# Patient Record
Sex: FEMALE | URBAN - METROPOLITAN AREA
[De-identification: names, ages, dates, MRNs, and addresses within clinical notes are randomized per-mention and may not be internally consistent; named-entity substitution may affect disease eponyms.]

---

## 2020-07-16 ENCOUNTER — NEW PATIENT (OUTPATIENT)
Dept: URBAN - METROPOLITAN AREA CLINIC 44 | Facility: CLINIC | Age: 60
End: 2020-07-16
Payer: MEDICAID

## 2020-07-16 ENCOUNTER — SURGERY (OUTPATIENT)
Dept: URBAN - METROPOLITAN AREA SURGERY 19 | Facility: SURGERY | Age: 60
End: 2020-07-16
Payer: MEDICAID

## 2020-07-16 DIAGNOSIS — H40.213 ACUTE ANGLE-CLOSURE GLAUCOMA, BILATERAL: Primary | ICD-10-CM

## 2020-07-16 PROCEDURE — 92002 INTRM OPH EXAM NEW PATIENT: CPT | Performed by: OPHTHALMOLOGY

## 2020-07-16 PROCEDURE — 92020 GONIOSCOPY: CPT | Performed by: OPHTHALMOLOGY

## 2020-07-16 RX ORDER — BRINZOLAMIDE/BRIMONIDINE TARTRATE 10; 2 MG/ML; MG/ML
SUSPENSION/ DROPS OPHTHALMIC
Qty: 3 | Refills: 1 | Status: INACTIVE
Start: 2020-07-16 | End: 2020-09-03

## 2020-07-16 RX ORDER — PREDNISOLONE ACETATE 10 MG/ML
1 % SUSPENSION/ DROPS OPHTHALMIC
Qty: 1 | Refills: 1 | Status: INACTIVE
Start: 2020-07-16 | End: 2020-09-03

## 2020-07-16 RX ORDER — BIMATOPROST 0.1 MG/ML
0.01 % SOLUTION/ DROPS OPHTHALMIC
Qty: 1 | Refills: 3 | Status: INACTIVE
Start: 2020-07-16 | End: 2020-07-31

## 2020-07-16 ASSESSMENT — INTRAOCULAR PRESSURE
OD: 18
OD: 18
OS: 16
OS: 30
OD: 12
OS: 16
OS: 30
OD: 12

## 2020-07-17 ENCOUNTER — NEW PATIENT (OUTPATIENT)
Dept: URBAN - METROPOLITAN AREA CLINIC 24 | Facility: CLINIC | Age: 60
End: 2020-07-17

## 2020-07-17 ENCOUNTER — SURGERY (OUTPATIENT)
Dept: URBAN - METROPOLITAN AREA SURGERY 12 | Facility: SURGERY | Age: 60
End: 2020-07-17
Payer: MEDICAID

## 2020-07-17 PROCEDURE — 99024 POSTOP FOLLOW-UP VISIT: CPT | Performed by: OPHTHALMOLOGY

## 2020-07-17 ASSESSMENT — INTRAOCULAR PRESSURE
OD: 11
OD: 11
OS: 12
OD: 13
OS: 12
OD: 13

## 2020-07-28 ENCOUNTER — FOLLOW UP ESTABLISHED (OUTPATIENT)
Dept: URBAN - METROPOLITAN AREA CLINIC 10 | Facility: CLINIC | Age: 60
End: 2020-07-28
Payer: MEDICAID

## 2020-07-28 DIAGNOSIS — H25.13 AGE-RELATED NUCLEAR CATARACT, BILATERAL: ICD-10-CM

## 2020-07-28 PROCEDURE — 92012 INTRM OPH EXAM EST PATIENT: CPT | Performed by: OPHTHALMOLOGY

## 2020-07-28 RX ORDER — LATANOPROST 50 UG/ML
0.005 % SOLUTION OPHTHALMIC
Qty: 1 | Refills: 1 | Status: INACTIVE
Start: 2020-07-28 | End: 2020-08-27

## 2020-07-28 ASSESSMENT — INTRAOCULAR PRESSURE
OS: 13
OD: 12

## 2020-09-03 ENCOUNTER — NEW PATIENT (OUTPATIENT)
Dept: URBAN - METROPOLITAN AREA CLINIC 10 | Facility: CLINIC | Age: 60
End: 2020-09-03
Payer: MEDICAID

## 2020-09-03 DIAGNOSIS — H25.813 COMBINED FORMS OF AGE-RELATED CATARACT, BILATERAL: Primary | ICD-10-CM

## 2020-09-03 PROCEDURE — 92014 COMPRE OPH EXAM EST PT 1/>: CPT | Performed by: OPTOMETRIST

## 2020-09-03 PROCEDURE — 92134 CPTRZ OPH DX IMG PST SGM RTA: CPT | Performed by: OPTOMETRIST

## 2020-09-03 PROCEDURE — 92004 COMPRE OPH EXAM NEW PT 1/>: CPT | Performed by: OPTOMETRIST

## 2020-09-03 PROCEDURE — 92250 FUNDUS PHOTOGRAPHY W/I&R: CPT | Performed by: OPTOMETRIST

## 2020-09-03 ASSESSMENT — VISUAL ACUITY
OD: 20/40
OS: 20/30

## 2020-09-03 ASSESSMENT — INTRAOCULAR PRESSURE
OD: 12
OS: 11

## 2021-04-13 ENCOUNTER — OFFICE VISIT (OUTPATIENT)
Dept: URBAN - METROPOLITAN AREA CLINIC 43 | Facility: CLINIC | Age: 61
End: 2021-04-13
Payer: MEDICAID

## 2021-04-13 DIAGNOSIS — H43.812 VITREOUS DEGENERATION, LEFT EYE: Primary | ICD-10-CM

## 2021-04-13 PROCEDURE — 92014 COMPRE OPH EXAM EST PT 1/>: CPT | Performed by: OPTOMETRIST

## 2021-04-13 PROCEDURE — 92134 CPTRZ OPH DX IMG PST SGM RTA: CPT | Performed by: OPTOMETRIST

## 2021-04-13 RX ORDER — POLYVINYL ALCOHOL 14 MG/ML
1.4 % SOLUTION/ DROPS OPHTHALMIC
Qty: 5 | Refills: 11 | Status: ACTIVE
Start: 2021-04-13

## 2021-04-13 ASSESSMENT — KERATOMETRY
OS: 44.13
OD: 44.38

## 2021-04-13 ASSESSMENT — INTRAOCULAR PRESSURE
OD: 11
OS: 11

## 2021-04-13 NOTE — IMPRESSION/PLAN
Impression: Acute angle-closure glaucoma, bilateral: H40.213. Plan: IOP well controlled on latanoprost qhs OU LPIs patent OU
return for 2-3 weeks for HVF/IOP/pachs/OCT RNFL

## 2021-04-13 NOTE — IMPRESSION/PLAN
Impression: Vitreous degeneration, left eye: H43.812. Plan: Warning signs of retinal tear and detachment discussed with patient. To return to clinic STAT if any change in symptoms consistent with RT or RD.  No VMt on mac OCT

## 2021-07-28 ENCOUNTER — OFFICE VISIT (OUTPATIENT)
Dept: URBAN - METROPOLITAN AREA CLINIC 10 | Facility: CLINIC | Age: 61
End: 2021-07-28
Payer: MEDICAID

## 2021-07-28 DIAGNOSIS — H04.123 DRY EYE SYNDROME OF BILATERAL LACRIMAL GLANDS: ICD-10-CM

## 2021-07-28 PROCEDURE — 76514 ECHO EXAM OF EYE THICKNESS: CPT | Performed by: OPTOMETRIST

## 2021-07-28 PROCEDURE — 92012 INTRM OPH EXAM EST PATIENT: CPT | Performed by: OPTOMETRIST

## 2021-07-28 PROCEDURE — 92134 CPTRZ OPH DX IMG PST SGM RTA: CPT | Performed by: OPTOMETRIST

## 2021-07-28 PROCEDURE — 92083 EXTENDED VISUAL FIELD XM: CPT | Performed by: OPTOMETRIST

## 2021-07-28 ASSESSMENT — INTRAOCULAR PRESSURE
OS: 12
OD: 11

## 2021-07-28 NOTE — IMPRESSION/PLAN
Impression: Acute angle-closure glaucoma, bilateral: H40.213. Plan: IOP well controlled on latanoprost qhs OU LPIs patent OU. Pachs thin OU. HVF scattered defects, no glaucomatous pattern OU. Continue Latanoprost QHS OU. RTC 9 mo CEE/OCT RNFL.

## 2021-07-28 NOTE — IMPRESSION/PLAN
Impression: Dry eye syndrome of bilateral lacrimal glands: H04.123. Plan: Recommend artificial tears at least 4 times a day and gel drop or tear ointment at bedtime. Add lid hygiene, Omega 3s. Discussed through [facility] .

## 2022-05-09 ENCOUNTER — OFFICE VISIT (OUTPATIENT)
Dept: URBAN - METROPOLITAN AREA CLINIC 10 | Facility: CLINIC | Age: 62
End: 2022-05-09
Payer: MEDICAID

## 2022-05-09 DIAGNOSIS — H43.812 VITREOUS DEGENERATION, LEFT EYE: ICD-10-CM

## 2022-05-09 DIAGNOSIS — H25.813 COMBINED FORMS OF AGE-RELATED CATARACT, BILATERAL: Primary | ICD-10-CM

## 2022-05-09 DIAGNOSIS — H40.213 ACUTE ANGLE-CLOSURE GLAUCOMA, BILATERAL: ICD-10-CM

## 2022-05-09 DIAGNOSIS — H04.123 DRY EYE SYNDROME OF BILATERAL LACRIMAL GLANDS: ICD-10-CM

## 2022-05-09 DIAGNOSIS — G44.89 OTHER HEADACHE SYNDROME: ICD-10-CM

## 2022-05-09 PROCEDURE — 99214 OFFICE O/P EST MOD 30 MIN: CPT | Performed by: OPTOMETRIST

## 2022-05-09 PROCEDURE — 92133 CPTRZD OPH DX IMG PST SGM ON: CPT | Performed by: OPTOMETRIST

## 2022-05-09 ASSESSMENT — VISUAL ACUITY
OS: 20/50
OD: 20/60

## 2022-05-09 ASSESSMENT — INTRAOCULAR PRESSURE
OS: 13
OD: 13

## 2022-05-09 NOTE — IMPRESSION/PLAN
Impression: Combined forms of age-related cataract, bilateral: H25.813. Plan:  Discussed cataracts with patient. Discussed treatment options. Surgical treatment is recommended. Surgical risks and benefits discussed. Patient elects surgical treatment. Recommend surgery OU, OD first. Patient is candidate/interested in standard lens, Aim OD: -0.25. Aim OS: -0.25. Consider MIGS. Glaucoma surgeon recommended.  Outcome of surgery limitations include:  Dry eye syndrome of bilateral lacrimal glands, Acute angle-closure glaucoma, bilateral, Vitreous degeneration, left eye,

## 2022-05-09 NOTE — IMPRESSION/PLAN
Impression: Acute angle-closure glaucoma, bilateral: H40.213. Plan: IOP well controlled on latanoprost qhs OU LPIs patent OU. Pachs thin OU. OCT RNFL normal OD, thin inferior OS. Last HVF scattered defects, no glaucomatous pattern OU. Continue Latanoprost QHS OU. Consider MIGS and glaucoma surgeon for cat sx.

## 2022-05-09 NOTE — IMPRESSION/PLAN
Impression: Other headache syndrome: G44.89. Plan: Ocular health unremarkable for headache. Possible sinus issues. See PCP for further evaluation.

## 2022-07-01 ENCOUNTER — TESTING ONLY (OUTPATIENT)
Dept: URBAN - METROPOLITAN AREA CLINIC 10 | Facility: CLINIC | Age: 62
End: 2022-07-01
Payer: MEDICAID

## 2022-07-01 DIAGNOSIS — H25.813 COMBINED FORMS OF AGE-RELATED CATARACT, BILATERAL: Primary | ICD-10-CM

## 2022-07-05 ENCOUNTER — OFFICE VISIT (OUTPATIENT)
Dept: URBAN - METROPOLITAN AREA CLINIC 10 | Facility: CLINIC | Age: 62
End: 2022-07-05
Payer: MEDICAID

## 2022-07-05 DIAGNOSIS — G44.89 OTHER HEADACHE SYNDROME: ICD-10-CM

## 2022-07-05 DIAGNOSIS — H43.813 VITREOUS DEGENERATION, BILATERAL: ICD-10-CM

## 2022-07-05 DIAGNOSIS — H40.213 ACUTE ANGLE-CLOSURE GLAUCOMA, BILATERAL: ICD-10-CM

## 2022-07-05 DIAGNOSIS — H40.033 ANATOMICAL NARROW ANGLE, BILATERAL: ICD-10-CM

## 2022-07-05 DIAGNOSIS — H04.123 DRY EYE SYNDROME OF BILATERAL LACRIMAL GLANDS: ICD-10-CM

## 2022-07-05 DIAGNOSIS — H52.203 BILATERAL ASTIGMATISM: ICD-10-CM

## 2022-07-05 PROCEDURE — 92020 GONIOSCOPY: CPT | Performed by: OPHTHALMOLOGY

## 2022-07-05 PROCEDURE — 92014 COMPRE OPH EXAM EST PT 1/>: CPT | Performed by: OPHTHALMOLOGY

## 2022-07-05 PROCEDURE — 92083 EXTENDED VISUAL FIELD XM: CPT | Performed by: OPHTHALMOLOGY

## 2022-07-05 ASSESSMENT — INTRAOCULAR PRESSURE
OD: 16
OS: 16

## 2022-07-05 NOTE — IMPRESSION/PLAN
Impression: Other headache syndrome: G44.89. Plan: Followed by PCP. Discussed with patient, understands this may limit vision after surgery.

## 2022-07-05 NOTE — IMPRESSION/PLAN
Impression: Bilateral astigmatism: H52.203. Plan: Declines AMP. Discussed with patient that she will need glasses after surgery. Discussed with patient, understands this may limit vision after surgery. Also discussed unmasking of astigmatism.

## 2022-07-05 NOTE — IMPRESSION/PLAN
Impression: Combined forms of age-related cataract, bilateral: H25.813. Plan: (( AIM -0.25 OU: INJECTABLE OU (DEXYCU 1st choice),  NO ORA OU, NO LRI OU - declines AMP; MIGS OU  (KDB 1st, Istent as backup) do not tape head, Glaucoma coverage, NOTE LENS TYPE: [id number]; Citizen of [place] SPEAKING;   s/p LPI OU - PRIOR ANGLE CLOSURE -- LARGE LENS    )) Discussed cataract diagnosis with the patient. Appropriate testing ordered for cataract diagnosis prior to Preop. Risks and benefits of surgical treatment were discussed and understood. Patient desires surgical treatment. Discussed lens options with pt and pt is ok with wearing glasses after surgery. Patient desires surgery to proceed with surgery OU, OD  First. Both eyes examined, medically necessary due to impact in activities of daily living. Discussed with pt limitations of MIGS device and it does not replace  Glaucoma eye drops and would have to continue treatment and would still need glasses after surgery. Discussed higher risks with smaller pupil and discussed iris stretch and higher risks of bleeding.  present at time of discussion. Discussed higher risks due to prior angle closure.

## 2022-07-05 NOTE — IMPRESSION/PLAN
Impression: Acute angle-closure glaucoma, bilateral: H40.213. Plan: PLAN: On XAL QHS OU , test reviewed, IOP is similar ou but may proceed with cataract surgery with MIGS  (KDB 1st, Istent at backup) and Discussed glaucoma may limit vision after surgery, may proceed with migs   in hopes of better iop control - understands does not eliminate meds. Discussed possible unmasking of scotoma after surgery. TESTS: Reviewed 7/5/22  Visual Field - OD: Poor-superior, inferior scattered defects,; OS: Poor-superior, inferior scattered defects, Discussed Glaucoma diagnosis in detail with patient. Emphasized and explain complaince. poor compliance can lead to Blindness.

## 2022-07-06 ENCOUNTER — ADULT PHYSICAL (OUTPATIENT)
Dept: URBAN - METROPOLITAN AREA CLINIC 10 | Facility: CLINIC | Age: 62
End: 2022-07-06
Payer: MEDICAID

## 2022-07-06 DIAGNOSIS — Z01.818 ENCOUNTER FOR OTHER PREPROCEDURAL EXAMINATION: Primary | ICD-10-CM

## 2022-07-06 DIAGNOSIS — H25.813 COMBINED FORMS OF AGE-RELATED CATARACT, BILATERAL: ICD-10-CM

## 2022-07-06 PROCEDURE — 99203 OFFICE O/P NEW LOW 30 MIN: CPT | Performed by: PHYSICIAN ASSISTANT

## 2022-07-12 ENCOUNTER — SURGERY (OUTPATIENT)
Dept: URBAN - METROPOLITAN AREA SURGERY 5 | Facility: SURGERY | Age: 62
End: 2022-07-12
Payer: MEDICAID

## 2022-07-12 DIAGNOSIS — H25.813 COMBINED FORMS OF AGE-RELATED CATARACT, BILATERAL: Primary | ICD-10-CM

## 2022-07-12 DIAGNOSIS — H40.213 ACUTE ANGLE-CLOSURE GLAUCOMA, BILATERAL: ICD-10-CM

## 2022-07-13 ENCOUNTER — POST-OPERATIVE VISIT (OUTPATIENT)
Dept: URBAN - METROPOLITAN AREA CLINIC 10 | Facility: CLINIC | Age: 62
End: 2022-07-13
Payer: MEDICAID

## 2022-07-13 DIAGNOSIS — Z48.810 ENCOUNTER FOR SURGICAL AFTERCARE FOLLOWING SURGERY ON A SENSE ORGAN: Primary | ICD-10-CM

## 2022-07-13 PROCEDURE — 99024 POSTOP FOLLOW-UP VISIT: CPT | Performed by: STUDENT IN AN ORGANIZED HEALTH CARE EDUCATION/TRAINING PROGRAM

## 2022-07-13 ASSESSMENT — INTRAOCULAR PRESSURE: OD: 18

## 2022-07-13 NOTE — IMPRESSION/PLAN
Impression: S/P Cataract Extraction/IOL/KDB OD - 1 Day. Encounter for surgical aftercare following surgery on a sense organ  Z48.810. Post operative instructions reviewed - Plan: Reviewed post-op instructions. RTC if any pain or sudden changes to vision.

## 2022-07-20 ENCOUNTER — POST-OPERATIVE VISIT (OUTPATIENT)
Dept: URBAN - METROPOLITAN AREA CLINIC 10 | Facility: CLINIC | Age: 62
End: 2022-07-20
Payer: MEDICAID

## 2022-07-20 DIAGNOSIS — Z48.810 ENCOUNTER FOR SURGICAL AFTERCARE FOLLOWING SURGERY ON A SENSE ORGAN: Primary | ICD-10-CM

## 2022-07-20 DIAGNOSIS — H25.12 AGE-RELATED NUCLEAR CATARACT, LEFT EYE: ICD-10-CM

## 2022-07-20 PROCEDURE — 99024 POSTOP FOLLOW-UP VISIT: CPT | Performed by: OPTOMETRIST

## 2022-07-20 RX ORDER — PREDNISOLONE ACETATE 10 MG/ML
1 % SUSPENSION/ DROPS OPHTHALMIC
Qty: 30 | Refills: 0 | Status: INACTIVE
Start: 2022-07-20 | End: 2022-07-21

## 2022-07-20 ASSESSMENT — VISUAL ACUITY
OS: 20/60
OD: 20/40

## 2022-07-20 NOTE — IMPRESSION/PLAN
Impression:  Encounter for surgical aftercare following surgery on a sense organ  Z48.810.  Post operative instructions reviewed - Plan: Start Prednisolone acetate QID OD
RTC in 1 wk for post-op

## 2022-07-28 ENCOUNTER — POST-OPERATIVE VISIT (OUTPATIENT)
Dept: URBAN - METROPOLITAN AREA CLINIC 10 | Facility: CLINIC | Age: 62
End: 2022-07-28
Payer: MEDICAID

## 2022-07-28 DIAGNOSIS — Z48.810 ENCOUNTER FOR SURGICAL AFTERCARE FOLLOWING SURGERY ON A SENSE ORGAN: Primary | ICD-10-CM

## 2022-07-28 PROCEDURE — 99024 POSTOP FOLLOW-UP VISIT: CPT | Performed by: OPTOMETRIST

## 2022-07-28 ASSESSMENT — VISUAL ACUITY: OD: 20/25

## 2022-07-28 ASSESSMENT — INTRAOCULAR PRESSURE: OD: 12

## 2022-07-28 NOTE — IMPRESSION/PLAN
Impression: S/P Cataract Extraction/IOL/KDB OD - 16 Days. Encounter for surgical aftercare following surgery on a sense organ  Z48.810. Condition is improving - Plan: Cleared for surgery second eye OS Confirmed Distance aim OD Pt happy c vision. Pt. will cont. pred OD tid x 1 wk then taper weekly.

## 2022-09-20 ENCOUNTER — OFFICE VISIT (OUTPATIENT)
Dept: URBAN - METROPOLITAN AREA CLINIC 10 | Facility: CLINIC | Age: 62
End: 2022-09-20
Payer: MEDICAID

## 2022-09-20 DIAGNOSIS — H43.813 VITREOUS DEGENERATION, BILATERAL: ICD-10-CM

## 2022-09-20 DIAGNOSIS — H25.812 COMBINED FORMS OF AGE-RELATED CATARACT, LEFT EYE: Primary | ICD-10-CM

## 2022-09-20 DIAGNOSIS — H04.123 TEAR FILM INSUFFICIENCY OF BILATERAL LACRIMAL GLANDS: ICD-10-CM

## 2022-09-20 DIAGNOSIS — H40.213 ACUTE ANGLE-CLOSURE GLAUCOMA, BILATERAL: ICD-10-CM

## 2022-09-20 PROCEDURE — 99214 OFFICE O/P EST MOD 30 MIN: CPT | Performed by: OPTOMETRIST

## 2022-09-20 PROCEDURE — 92134 CPTRZ OPH DX IMG PST SGM RTA: CPT | Performed by: OPTOMETRIST

## 2022-09-20 ASSESSMENT — INTRAOCULAR PRESSURE
OS: 12
OD: 13

## 2022-09-20 ASSESSMENT — VISUAL ACUITY
OS: 20/30
OD: 20/30

## 2022-09-20 NOTE — IMPRESSION/PLAN
Impression: Combined forms of age-related cataract, left eye: H25.812. Plan:  Discussed cataracts with patient. Discussed treatment options. Surgical treatment is recommended. Surgical risks and benefits discussed. Patient elects surgical treatment. Recommend surgery OS. Patient is candidate/interested in standard lens. Aim OS: undecided. Patient will need glasses for full time wear. Patient will need glasses for all near work, including computer. Patient advised of implications of lost myopia. Patient will need glasses for distance work (if near aim).

## 2022-09-20 NOTE — IMPRESSION/PLAN
Impression: Acute angle-closure glaucoma, bilateral: H40.213. Plan: On XAL QHS OU  (KDB 1st, Istent at backup) and Discussed glaucoma may limit vision after surgery. 7/5/22  Visual Field - OD: Poor-superior, inferior scattered defects,; OS: Poor-superior, inferior scattered defects, Discussed Glaucoma diagnosis in detail with patient. Emphasized and explain complaince. poor compliance can lead to Blindness. Cont XAL QHS OU.

## 2022-09-27 ENCOUNTER — ADULT PHYSICAL (OUTPATIENT)
Dept: URBAN - METROPOLITAN AREA CLINIC 10 | Facility: CLINIC | Age: 62
End: 2022-09-27
Payer: MEDICAID

## 2022-09-27 DIAGNOSIS — H25.12 AGE-RELATED NUCLEAR CATARACT, LEFT EYE: ICD-10-CM

## 2022-09-27 DIAGNOSIS — Z01.818 ENCOUNTER FOR OTHER PREPROCEDURAL EXAMINATION: Primary | ICD-10-CM

## 2022-09-27 PROCEDURE — 99213 OFFICE O/P EST LOW 20 MIN: CPT | Performed by: PHYSICIAN ASSISTANT

## 2022-10-04 ENCOUNTER — SURGERY (OUTPATIENT)
Dept: URBAN - METROPOLITAN AREA SURGERY 5 | Facility: SURGERY | Age: 62
End: 2022-10-04
Payer: MEDICAID

## 2022-10-04 DIAGNOSIS — H25.12 AGE-RELATED NUCLEAR CATARACT, LEFT EYE: Primary | ICD-10-CM

## 2022-10-04 DIAGNOSIS — H40.213 ACUTE ANGLE-CLOSURE GLAUCOMA, BILATERAL: ICD-10-CM

## 2022-10-05 ENCOUNTER — POST-OPERATIVE VISIT (OUTPATIENT)
Dept: URBAN - METROPOLITAN AREA CLINIC 10 | Facility: CLINIC | Age: 62
End: 2022-10-05

## 2022-10-05 DIAGNOSIS — Z96.1 PRESENCE OF INTRAOCULAR LENS: Primary | ICD-10-CM

## 2022-10-05 PROCEDURE — 99024 POSTOP FOLLOW-UP VISIT: CPT | Performed by: OPTOMETRIST

## 2022-10-05 ASSESSMENT — INTRAOCULAR PRESSURE: OS: 10

## 2022-10-05 NOTE — IMPRESSION/PLAN
Impression: S/P Cataract Extraction/IOL/MARSHA OS - 1 Day. Presence of intraocular lens  Z96.1. Post operative instructions reviewed - Plan: Dexycu, no gtts. Cont Latanoprost qhs OU
 RTC as scheduled c MRx and IOP check c Mac OCT.

## 2022-11-07 ENCOUNTER — POST-OPERATIVE VISIT (OUTPATIENT)
Dept: URBAN - METROPOLITAN AREA CLINIC 10 | Facility: CLINIC | Age: 62
End: 2022-11-07
Payer: MEDICAID

## 2022-11-07 DIAGNOSIS — H52.4 PRESBYOPIA: Primary | ICD-10-CM

## 2022-11-07 DIAGNOSIS — Z96.1 PRESENCE OF INTRAOCULAR LENS: ICD-10-CM

## 2022-11-07 PROCEDURE — 99024 POSTOP FOLLOW-UP VISIT: CPT | Performed by: OPTOMETRIST

## 2022-11-07 ASSESSMENT — INTRAOCULAR PRESSURE
OD: 11
OS: 10

## 2022-11-07 ASSESSMENT — VISUAL ACUITY
OD: 20/30
OS: 20/20

## 2022-11-07 NOTE — IMPRESSION/PLAN
Impression:  Presence of intraocular lens  Z96.1. Plan: Cold compresses and artificial tears as needed for comfort. May use Pataday QD OU.

## 2023-01-25 ENCOUNTER — OFFICE VISIT (OUTPATIENT)
Dept: URBAN - METROPOLITAN AREA CLINIC 10 | Facility: CLINIC | Age: 63
End: 2023-01-25
Payer: MEDICAID

## 2023-01-25 DIAGNOSIS — H04.123 DRY EYE SYNDROME OF BILATERAL LACRIMAL GLANDS: ICD-10-CM

## 2023-01-25 DIAGNOSIS — H43.813 VITREOUS DEGENERATION, BILATERAL: ICD-10-CM

## 2023-01-25 DIAGNOSIS — H40.213 ACUTE ANGLE-CLOSURE GLAUCOMA, BILATERAL: Primary | ICD-10-CM

## 2023-01-25 DIAGNOSIS — H10.45 OTHER CHRONIC ALLERGIC CONJUNCTIVITIS: ICD-10-CM

## 2023-01-25 PROCEDURE — 92014 COMPRE OPH EXAM EST PT 1/>: CPT | Performed by: OPTOMETRIST

## 2023-01-25 PROCEDURE — 92133 CPTRZD OPH DX IMG PST SGM ON: CPT | Performed by: OPTOMETRIST

## 2023-01-25 ASSESSMENT — INTRAOCULAR PRESSURE
OD: 11
OS: 11

## 2023-01-25 ASSESSMENT — VISUAL ACUITY
OD: 20/25
OS: 20/25

## 2023-01-25 NOTE — IMPRESSION/PLAN
Impression: Acute angle-closure glaucoma, bilateral: H40.213. Plan: IOP well controlled on latanoprost qhs OU LPIs patent OU. Pt is also s/p cat sx. Pachs thin OU. OCT RNFL normal OD, thin inferior OS. STABLE. Last HVF scattered defects, no glaucomatous pattern OU. Continue Latanoprost QHS OU. RTC 1 year CEE c possible HVF 24-2 and OCT RNFL.

## 2023-01-25 NOTE — IMPRESSION/PLAN
Impression: Dry eye syndrome of bilateral lacrimal glands: H04.123. Plan: Recommend artificial tears at least 4 times a day and gel drop or tear ointment at bedtime. Add lid hygiene, Omega 3s. Discussed through .

## 2023-11-08 ENCOUNTER — OFFICE VISIT (OUTPATIENT)
Dept: URBAN - METROPOLITAN AREA CLINIC 10 | Facility: CLINIC | Age: 63
End: 2023-11-08
Payer: MEDICAID

## 2023-11-08 DIAGNOSIS — H04.123 DRY EYE SYNDROME OF BILATERAL LACRIMAL GLANDS: Primary | ICD-10-CM

## 2023-11-08 DIAGNOSIS — H26.493 OTHER SECONDARY CATARACT, BILATERAL: ICD-10-CM

## 2023-11-08 DIAGNOSIS — H40.213 ACUTE ANGLE-CLOSURE GLAUCOMA, BILATERAL: ICD-10-CM

## 2023-11-08 DIAGNOSIS — H10.45 OTHER CHRONIC ALLERGIC CONJUNCTIVITIS: ICD-10-CM

## 2023-11-08 DIAGNOSIS — H43.312 VITREOUS MEMBRANES AND STRANDS, LEFT EYE: ICD-10-CM

## 2023-11-08 PROCEDURE — 92133 CPTRZD OPH DX IMG PST SGM ON: CPT | Performed by: OPTOMETRIST

## 2023-11-08 PROCEDURE — 92134 CPTRZ OPH DX IMG PST SGM RTA: CPT | Performed by: OPTOMETRIST

## 2023-11-08 PROCEDURE — 99214 OFFICE O/P EST MOD 30 MIN: CPT | Performed by: OPTOMETRIST

## 2023-11-08 RX ORDER — LIFITEGRAST 50 MG/ML
5 % SOLUTION/ DROPS OPHTHALMIC
Qty: 60 | Refills: 11 | Status: ACTIVE
Start: 2023-11-08

## 2023-11-08 ASSESSMENT — VISUAL ACUITY
OS: 20/25
OD: 20/20

## 2023-11-08 ASSESSMENT — INTRAOCULAR PRESSURE
OS: 11
OD: 11

## 2024-02-29 ENCOUNTER — OFFICE VISIT (OUTPATIENT)
Dept: URBAN - METROPOLITAN AREA CLINIC 10 | Facility: CLINIC | Age: 64
End: 2024-02-29
Payer: MEDICARE

## 2024-02-29 DIAGNOSIS — H40.213 ACUTE ANGLE-CLOSURE GLAUCOMA, BILATERAL: ICD-10-CM

## 2024-02-29 DIAGNOSIS — H26.493 OTHER SECONDARY CATARACT, BILATERAL: ICD-10-CM

## 2024-02-29 DIAGNOSIS — H04.123 DRY EYE SYNDROME OF BILATERAL LACRIMAL GLANDS: Primary | ICD-10-CM

## 2024-02-29 DIAGNOSIS — H52.4 PRESBYOPIA: ICD-10-CM

## 2024-02-29 DIAGNOSIS — H10.45 OTHER CHRONIC ALLERGIC CONJUNCTIVITIS: ICD-10-CM

## 2024-02-29 PROCEDURE — 99213 OFFICE O/P EST LOW 20 MIN: CPT | Performed by: OPTOMETRIST

## 2024-02-29 RX ORDER — LATANOPROST 50 UG/ML
0.005 % SOLUTION OPHTHALMIC
Qty: 7.5 | Refills: 11 | Status: ACTIVE
Start: 2024-02-29

## 2024-02-29 RX ORDER — CYCLOSPORINE 0.5 MG/ML
0.05 % EMULSION OPHTHALMIC
Qty: 180 | Refills: 3 | Status: ACTIVE
Start: 2024-02-29

## 2024-02-29 ASSESSMENT — INTRAOCULAR PRESSURE
OD: 11
OS: 12

## 2024-02-29 ASSESSMENT — VISUAL ACUITY
OD: 20/20
OS: 20/25

## 2025-02-12 ENCOUNTER — OFFICE VISIT (OUTPATIENT)
Dept: URBAN - METROPOLITAN AREA CLINIC 10 | Facility: CLINIC | Age: 65
End: 2025-02-12
Payer: MEDICARE

## 2025-02-12 DIAGNOSIS — H40.213 ACUTE ANGLE-CLOSURE GLAUCOMA, BILATERAL: ICD-10-CM

## 2025-02-12 DIAGNOSIS — H43.312 VITREOUS MEMBRANES AND STRANDS, LEFT EYE: ICD-10-CM

## 2025-02-12 DIAGNOSIS — H26.493 OTHER SECONDARY CATARACT, BILATERAL: ICD-10-CM

## 2025-02-12 DIAGNOSIS — H04.123 DRY EYE SYNDROME OF BILATERAL LACRIMAL GLANDS: Primary | ICD-10-CM

## 2025-02-12 PROCEDURE — 92133 CPTRZD OPH DX IMG PST SGM ON: CPT | Performed by: OPTOMETRIST

## 2025-02-12 PROCEDURE — 92083 EXTENDED VISUAL FIELD XM: CPT | Performed by: OPTOMETRIST

## 2025-02-12 PROCEDURE — 92014 COMPRE OPH EXAM EST PT 1/>: CPT | Performed by: OPTOMETRIST

## 2025-02-12 ASSESSMENT — VISUAL ACUITY
OS: 20/30
OD: 20/25

## 2025-02-12 ASSESSMENT — INTRAOCULAR PRESSURE
OD: 12
OS: 13

## 2025-02-12 ASSESSMENT — KERATOMETRY
OD: 44.63
OS: 44.88